# Patient Record
Sex: FEMALE | Race: WHITE | HISPANIC OR LATINO | Employment: STUDENT | URBAN - METROPOLITAN AREA
[De-identification: names, ages, dates, MRNs, and addresses within clinical notes are randomized per-mention and may not be internally consistent; named-entity substitution may affect disease eponyms.]

---
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
## 2024-01-04 NOTE — PROGRESS NOTES
1. Bilateral papilledema likely secondary to idiopathic intracranial hypertension but formal diagnosis not yet proven.    Patient with new onset chronic headaches and migraines 1/2023 with associated papilledema. Had MRI Brain/Orbit suggestive of high intracranial pressure. Patient has not had LP with CSF studies. Patient was tried on topamax but had to stop due to mental / mood disturbance side effects from medication. Baseline OCT today shows thickened RNFL each eye OD>left eye. She has edema grade 4/5 edema in the right eye and grade 2/5 in the left eye on fundus exam. Based upon history, presentation, and patient fits steotypical body habitus patient likely has IIH.  Central vision remains intact today however she has right eye more than left eye  visual field defects that are moderate right eye and more mild left eye corresponding to her papilledema grade.    Recommend completing work-up with MRV (to rule-out unlikely dural venous sinus thrombosis), LP and CSF studies. Will start patient on diamox while waiting for results and images. Discussed common side effects of diamox such as peripheral neuropathy and taste changes. Patient voiced understanding and agreed to starting medication.  Patient understands that close follow-up required initially as if she were to worsen in terms of visual fields or papilledema then we would need to consider urgent surgical intervention to minimize additional vision loss.    Plan   MRV Brain   LP and CSF studies   Start diamox 500mg twice a day for 1 week then increase 1000mg twice daily if tolerated  Discussed the pathophysiology of idiopathic intracranial hypertension and major risk factor of weight with benefit to modest weight loss- 10% of body weight from time of diagnosis (once her diagnosis is formally proven)  Return to clinic 2-3 weeks     Addendum: 1/17/24  MRV was negative for dural venous sinus thrombosis on 1/7/24  Lumbar puncture showed elevated opening pressure  at 36 cm H20 on Diamox.   Cerebrospinal fluid constituents normal including total protein (25.1) and cellcount (4 / 2 wbc / rbc). This proves a diagnosis of idiopathic intracranial hypertension. Follow-up with me scheduled for 1/25/24      Salina Ryan is a pleasant 18 year old female who presents to my neuro-ophthalmology clinic today having been referred by Dr. Gerber for papilledema.    HPI: 18-year-old female with no significant past medical history.  Saw her eye doctor Dr. Gerber on 12/18/2023 for left eye foreign body sensation that began that day.  She stated that she was also diagnosed with papilledema in August 2023 by a different provider.  She underwent MRI that was normal.  She followed up with a neurologist in October 2022 who diagnosed her with migraines and started her on topiramate.  Patient was inquiring about what else needs to be done, and patient was referred to neuro-ophthalmology.  At outside eye doctor's visit she was noted to have grade 3 papilledema in the right eye and grade 2 in the left.    Per patient started having severe headaches/migraines in February 2023 was seen by outside eye provider Dr. Weir in August 2023 who noted patient had optic nerve swelling. Patient was told to get an MRI which she got MRI Brain/Orbits that was read as normal. Patient was then referred to neurology Dr. Pelayo who she saw in 10/2023 who said patient had predominately migraines based upon MRI not showing flattening of the posterior globes. She was started on topamax 25mg BID which she took for about a week but stopped due to mental health reasons that were a side effect of the medication (depression and mood changes).  She also had prominent problems of mental clouding and difficulty with stuttering and responding quickly to questions.  Her neurologist was not aware that she stopped taking topiramate.  Headaches have improved she's only had 1 bad migraine since 10/2023 but continues to have  smaller headaches 1-2 weekly. She takes excedrine for the smaller headaches that help them resolve.     Today patient denies headaches, double vision, pulsatile tinnitus. She does endorse TVO's where her vision will black out OD>OS.  These episodes last about 15 seconds and have been present since around August.  She thinks these are improving recently.  Patient states she feels like she's gained weight but unsure of how much as she hasn't weighed herself. Denies prescription facial creams, vitamin A supplements, no liver intake, hormone contraception.     No abnormal blood clotting problems in the family or herself.    Independent historians:  Patient    Review of outside testing:  MRI Brain 8/17/23  Impression:   MRI Orbits:    1. No orbital abnormality identified.      MRI Head:    1. No acute intracranial abnormality.      My interpretation performed today of outside testing:  I have independently reviewed MRI Brain/orbit  performed 8/2023. There is globe flattening and dilated optic nerve sheath. There is questionable partially empty sella.       Review of outside clinical notes:    12/18/23 -- Visit with Dr. Gerber  Assessment/Plan:  1. Papilledema of both eyes  -Was diagnosed with papilledema in August with Dr. Weir. MRI normal, saw Neurologist in October but does not have any follow ups, diagnosed with migraines. Patient unsure what next steps are. Recommend consult with [facility]. Discussed importance of follow up, this is vision threatening.     2. Dry eye  3. Corneal erosion of left eye  -start systane complete 4x/day. Return to clinic if any pain/redness/light sensitivity/decreased vision. No past injury.     Follow up Neurop[facility]mology [facility].      10/17/23 - Visit with Neurology, Dr. Pelayo  Assessment and Plan:    #. Migraines  Patient's symptoms sound most consistent with a migraine given her photophobia, phonophobia, and improvement with sleeping/Excedrin. There is question of IIH  given the increased eye pressure found at an optometrist office. However, no worsening headaches with bending forward or laying down. Her MRI also did not show any evidence of disc flattening. Her discs were not clearly seen on exam today but eye report noted normal disc margins with only a slight increase in pressure in left eye to 24mmHg (normal limit 21). Weight loss of 10% of body weight would be helpful if IIH. Regardless, will start her on topiramate which would have benefit for both IIH and migraine headaches.  -Maintain headache diary  -Migraine abortive treatment (to stop headaches when they occur):  - Start sumatriptan 100mg PO PRN. Take this medication as close to the onset of symptoms as possible for maximum effect.  - If minimal or partial improvement, can increase dose to 100mg PO PRN  - If minimal to no improvement after an adequate trial of sumatriptan, other abortive medications that can be considered include: strong NSAIDs (naproxen, meloxicam,indomethacin, ketorolac), dopaminergic antiemetics (prochlorperazine, promethazine, metoclopramide), other triptans (eletriptan, rizatriptan), and steroids (prednisone, methylprednisolone, dexamethasone)  - Do not use opioids or barbiturates (including butalbital contained in Fioricet/Fiorinal) for chronic headaches, as these medications are associated with rebound headaches, dependence, tolerance, and withdrawa  - Migraine prophylactic treatment (to decrease headache frequency and severity):  - Start topiramate 25mg PO BID. Can increase dose to 50mg BID after 1 week at 25mg BID.  - If minimal improvement, other prophylactic medications that can be considered include: TCAs (nortriptyline), AEDs (valproic acid, gabapentin, pregabalin, lamotrigine, levetiracetam), beta-blockers (timolol, propranolol, nadolol, atenolol), verapamil, ARBs (candesartan, irbesartan, losartan), and supplements (riboflavin, magnesium, feverfew)  - Recommend daily physical activity for  migraine prophylaxis  - Recommend good sleep hygiene for improved sleep as well as headaches, including maintaining routine bedtime and wake up times, being physically active during the day, not lying in bed or using the bedroom for activities other than sleep, and minimizing screen time before bed    Anamika Pelayo MD      Past medical history:    [place] teeth extraction 10/2022  Mild scoliosis    Patient currently has no medications in their medication list..  Confirm patient takes no medications.    Family history / social history:  Patient's grandmother -HTN   Mother has grave's disease   Patient   None smoker, none alcohol intake     Exam:  VA 20/30 OD, 20/30 OS. Pupils no rAPD. Slit lamp exam unremarkable. Dilated fundus exam shows grade 4 papilledema right eye and 2 left eye.     Tests ordered and interpreted today:   OCT RNFL:    Severe diffuse retinal nerve fiber layer thickening right eye and moderate left eye compared to age-matched controls    Octopus automated 30 degree visual field-reliable in both eyes   Right eye: inferior arcuate scotoma and superior nasal step  Left eye: superior dense nasal step         Shorty Perera MD   Fellow, Neuro-Ophthalmology     Precharting:  Fabian Mohr MD, PGY3  Ophthalmology Resident  [facility]    65 minutes were spent on the date of the encounter by me doing chart review, history and exam, documentation, and further activities as noted above    Complete documentation of historical and exam elements from today's encounter can be found in the full encounter summary report (not reduplicated in this progress note).  I personally obtained the chief complaint(s) and history of present illness.  I confirmed and edited as necessary the review of systems, past medical/surgical history, family history, social history, and examination findings as documented by others; and I examined the patient myself.  I personally reviewed the relevant tests, images,  and reports as documented above.  I formulated and edited as necessary the assessment and plan and discussed the findings and management plan with the patient and family.  I personally reviewed the ophthalmic test(s) associated with this encounter, agree with the interpretation(s) as documented by the resident/fellow, and have edited the corresponding report(s) as necessary.     Jam Ospina MD

## 2024-01-05 ENCOUNTER — OFFICE VISIT (OUTPATIENT)
Dept: OPHTHALMOLOGY | Facility: CLINIC | Age: 19
End: 2024-01-05
Attending: OPHTHALMOLOGY
Payer: COMMERCIAL

## 2024-01-05 DIAGNOSIS — H47.11 PAPILLEDEMA ASSOCIATED WITH INCREASED INTRACRANIAL PRESSURE: Primary | ICD-10-CM

## 2024-01-05 DIAGNOSIS — H53.40 VISUAL FIELD DEFECT: ICD-10-CM

## 2024-01-05 DIAGNOSIS — H53.10 SUBJECTIVE VISUAL DISTURBANCE: ICD-10-CM

## 2024-01-05 PROBLEM — N94.6 SEVERE MENSTRUAL CRAMPS: Status: ACTIVE | Noted: 2018-11-06

## 2024-01-05 PROBLEM — H47.10 PAPILLEDEMA: Status: ACTIVE | Noted: 2023-08-01

## 2024-01-05 PROBLEM — M41.9 MILD SCOLIOSIS: Status: ACTIVE | Noted: 2018-05-08

## 2024-01-05 PROBLEM — K64.8 HEMORRHOIDS, INTERNAL: Status: ACTIVE | Noted: 2020-10-16

## 2024-01-05 PROBLEM — H52.223 REGULAR ASTIGMATISM, BILATERAL: Status: ACTIVE | Noted: 2023-08-01

## 2024-01-05 PROBLEM — H52.13 MYOPIA, BILATERAL: Status: ACTIVE | Noted: 2023-08-01

## 2024-01-05 PROBLEM — K59.09 CHRONIC CONSTIPATION: Status: ACTIVE | Noted: 2020-10-16

## 2024-01-05 PROCEDURE — 92083 EXTENDED VISUAL FIELD XM: CPT | Performed by: OPHTHALMOLOGY

## 2024-01-05 PROCEDURE — 99205 OFFICE O/P NEW HI 60 MIN: CPT | Mod: GC | Performed by: OPHTHALMOLOGY

## 2024-01-05 PROCEDURE — 99214 OFFICE O/P EST MOD 30 MIN: CPT | Performed by: OPHTHALMOLOGY

## 2024-01-05 PROCEDURE — 92133 CPTRZD OPH DX IMG PST SGM ON: CPT | Performed by: OPHTHALMOLOGY

## 2024-01-05 RX ORDER — ACETAZOLAMIDE 500 MG/1
1000 CAPSULE, EXTENDED RELEASE ORAL 2 TIMES DAILY
Qty: 120 CAPSULE | Refills: 3 | Status: SHIPPED | OUTPATIENT
Start: 2024-01-05 | End: 2024-02-01

## 2024-01-05 ASSESSMENT — CONF VISUAL FIELD
METHOD: COUNTING FINGERS
OS_INFERIOR_NASAL_RESTRICTION: 0
OS_NORMAL: 1
OS_SUPERIOR_NASAL_RESTRICTION: 0
OD_NORMAL: 1
OD_SUPERIOR_TEMPORAL_RESTRICTION: 0
OD_INFERIOR_NASAL_RESTRICTION: 0
OS_INFERIOR_TEMPORAL_RESTRICTION: 0
OD_INFERIOR_TEMPORAL_RESTRICTION: 0
OS_SUPERIOR_TEMPORAL_RESTRICTION: 0
OD_SUPERIOR_NASAL_RESTRICTION: 0

## 2024-01-05 ASSESSMENT — VISUAL ACUITY
OD_SC+: -1
OD_PH_SC+: +2
OS_SC: 20/30
OD_SC: 20/50
OD_PH_SC: 20/30
METHOD: SNELLEN - LINEAR

## 2024-01-05 ASSESSMENT — EXTERNAL EXAM - RIGHT EYE: OD_EXAM: NORMAL

## 2024-01-05 ASSESSMENT — SLIT LAMP EXAM - LIDS
COMMENTS: NORMAL
COMMENTS: NORMAL

## 2024-01-05 ASSESSMENT — EXTERNAL EXAM - LEFT EYE: OS_EXAM: NORMAL

## 2024-01-05 ASSESSMENT — TONOMETRY
OD_IOP_MMHG: 17
IOP_METHOD: ICARE
OS_IOP_MMHG: 14

## 2024-01-05 NOTE — LETTER
2024    RE: Salina Ryan  : 2005  MRN: [id number]    Dear Dr. Gerber    Thank you for referring your patient, Salina Ryan, to my neuro-ophthalmology clinic recently.  After a thorough neuro-ophthalmic history and examination, I came to the following conclusions:     1. Bilateral papilledema likely secondary to idiopathic intracranial hypertension but formal diagnosis not yet proven.    Patient with new onset chronic headaches and migraines 2023 with associated papilledema. Had MRI Brain/Orbit suggestive of high intracranial pressure. Patient has not had LP with CSF studies. Patient was tried on topamax but had to stop due to mental / mood disturbance side effects from medication. Baseline OCT today shows thickened RNFL each eye OD>left eye. She has edema grade 4/5 edema in the right eye and grade 2/5 in the left eye on fundus exam. Based upon history, presentation, and patient fits steotypical body habitus patient likely has IIH.  Central vision remains intact today however she has right eye more than left eye  visual field defects that are moderate right eye and more mild left eye corresponding to her papilledema grade.    Recommend completing work-up with MRV (to rule-out unlikely dural venous sinus thrombosis), LP and CSF studies. Will start patient on diamox while waiting for results and images. Discussed common side effects of diamox such as peripheral neuropathy and taste changes. Patient voiced understanding and agreed to starting medication.  Patient understands that close follow-up required initially as if she were to worsen in terms of visual fields or papilledema then we would need to consider urgent surgical intervention to minimize additional vision loss.    Plan   MRV Brain   LP and CSF studies   Start diamox 500mg twice a day for 1 week then increase 1000mg twice daily if tolerated  Discussed the pathophysiology of idiopathic intracranial hypertension and  major risk factor of weight with benefit to modest weight loss- 10% of body weight from time of diagnosis (once her diagnosis is formally proven)  Return to clinic 2-3 weeks     Addendum: 1/17/24  MRV was negative for dural venous sinus thrombosis on 1/7/24  Lumbar puncture showed elevated opening pressure at 36 cm H20 on Diamox.   Cerebrospinal fluid constituents normal including total protein (25.1) and cellcount (4 / 2 wbc / rbc). This proves a diagnosis of idiopathic intracranial hypertension. Follow-up with me scheduled for 1/25/24      Salina Ryan is a pleasant 18 year old female who presents to my neuro-ophthalmology clinic today having been referred by Dr. Gerber for papilledema.    HPI: 18-year-old female with no significant past medical history.  Saw her eye doctor Dr. Gerber on 12/18/2023 for left eye foreign body sensation that began that day.  She stated that she was also diagnosed with papilledema in August 2023 by a different provider.  She underwent MRI that was normal.  She followed up with a neurologist in October 2022 who diagnosed her with migraines and started her on topiramate.  Patient was inquiring about what else needs to be done, and patient was referred to neuro-ophthalmology.  At outside eye doctor's visit she was noted to have grade 3 papilledema in the right eye and grade 2 in the left.    Per patient started having severe headaches/migraines in February 2023 was seen by outside eye provider Dr. Weir in August 2023 who noted patient had optic nerve swelling. Patient was told to get an MRI which she got MRI Brain/Orbits that was read as normal. Patient was then referred to neurology Dr. Pelayo who she saw in 10/2023 who said patient had predominately migraines based upon MRI not showing flattening of the posterior globes. She was started on topamax 25mg BID which she took for about a week but stopped due to mental health reasons that were a side effect of the medication  (depression and mood changes).  She also had prominent problems of mental clouding and difficulty with stuttering and responding quickly to questions.  Her neurologist was not aware that she stopped taking topiramate.  Headaches have improved she's only had 1 bad migraine since 10/2023 but continues to have smaller headaches 1-2 weekly. She takes excedrine for the smaller headaches that help them resolve.     Today patient denies headaches, double vision, pulsatile tinnitus. She does endorse TVO's where her vision will black out OD>OS.  These episodes last about 15 seconds and have been present since around August.  She thinks these are improving recently.  Patient states she feels like she's gained weight but unsure of how much as she hasn't weighed herself. Denies prescription facial creams, vitamin A supplements, no liver intake, hormone contraception.     No abnormal blood clotting problems in the family or herself.    Independent historians:  Patient    Review of outside testing:  MRI Brain 8/17/23  Impression:   MRI Orbits:    1. No orbital abnormality identified.      MRI Head:    1. No acute intracranial abnormality.      My interpretation performed today of outside testing:  I have independently reviewed MRI Brain/orbit  performed 8/2023. There is globe flattening and dilated optic nerve sheath. There is questionable partially empty sella.       Review of outside clinical notes:    12/18/23 -- Visit with Dr. Gerber  Assessment/Plan:  1. Papilledema of both eyes  -Was diagnosed with papilledema in August with Dr. Weir. MRI normal, saw Neurologist in October but does not have any follow ups, diagnosed with migraines. Patient unsure what next steps are. Recommend consult with [facility]. Discussed importance of follow up, this is vision threatening.     2. Dry eye  3. Corneal erosion of left eye  -start systane complete 4x/day. Return to clinic if any pain/redness/light sensitivity/decreased  vision. No past injury.     Follow up Neurophthalmology [facility].      10/17/23 - Visit with Neurology, Dr. Pelayo  Assessment and Plan:    #. Migraines  Patient's symptoms sound most consistent with a migraine given her photophobia, phonophobia, and improvement with sleeping/Excedrin. There is question of IIH given the increased eye pressure found at an optometrist office. However, no worsening headaches with bending forward or laying down. Her MRI also did not show any evidence of disc flattening. Her discs were not clearly seen on exam today but eye report noted normal disc margins with only a slight increase in pressure in left eye to 24mmHg (normal limit 21). Weight loss of 10% of body weight would be helpful if IIH. Regardless, will start her on topiramate which would have benefit for both IIH and migraine headaches.  -Maintain headache diary  -Migraine abortive treatment (to stop headaches when they occur):  - Start sumatriptan 100mg PO PRN. Take this medication as close to the onset of symptoms as possible for maximum effect.  - If minimal or partial improvement, can increase dose to 100mg PO PRN  - If minimal to no improvement after an adequate trial of sumatriptan, other abortive medications that can be considered include: strong NSAIDs (naproxen, meloxicam,indomethacin, ketorolac), dopaminergic antiemetics (prochlorperazine, promethazine, metoclopramide), other triptans (eletriptan, rizatriptan), and steroids (prednisone, methylprednisolone, dexamethasone)  - Do not use opioids or barbiturates (including butalbital contained in Fioricet/Fiorinal) for chronic headaches, as these medications are associated with rebound headaches, dependence, tolerance, and withdrawa  - Migraine prophylactic treatment (to decrease headache frequency and severity):  - Start topiramate 25mg PO BID. Can increase dose to 50mg BID after 1 week at 25mg BID.  - If minimal improvement, other prophylactic medications that can be  considered include: TCAs (nortriptyline), AEDs (valproic acid, gabapentin, pregabalin, lamotrigine, levetiracetam), beta-blockers (timolol, propranolol, nadolol, atenolol), verapamil, ARBs (candesartan, irbesartan, losartan), and supplements (riboflavin, magnesium, feverfew)  - Recommend daily physical activity for migraine prophylaxis  - Recommend good sleep hygiene for improved sleep as well as headaches, including maintaining routine bedtime and wake up times, being physically active during the day, not lying in bed or using the bedroom for activities other than sleep, and minimizing screen time before bed    Anamika Pelayo MD      Past medical history:    [place] teeth extraction 10/2022  Mild scoliosis    Patient currently has no medications in their medication list..  Confirm patient takes no medications.    Family history / social history:  Patient's grandmother -HTN   Mother has grave's disease   Patient   None smoker, none alcohol intake     Exam:  VA 20/30 OD, 20/30 OS. Pupils no rAPD. Slit lamp exam unremarkable. Dilated fundus exam shows grade 4 papilledema right eye and 2 left eye.     Tests ordered and interpreted today:   OCT RNFL:    Severe diffuse retinal nerve fiber layer thickening right eye and moderate left eye compared to age-matched controls    Octopus automated 30 degree visual field-reliable in both eyes   Right eye: inferior arcuate scotoma and superior nasal step  Left eye: superior dense nasal step      Again, thank you for trusting me with the care of your patient.  For further exam details, please feel free to contact our office for additional records.  If you wish to contact me regarding this patient please email me at [email or web address] or give my clinic a call to arrange a phone conversation.    Sincerely,    Jam Ospina MD  , Neuro-Ophthalmology and Adult Strabismus Surgery  [facility] Chair in Neuro-Ophthalmology  Department of  Ophthalmology and Visual Neurosciences  [facility]    DX: papilledema, idiopathic intracranial hypertension

## 2024-01-05 NOTE — NURSING NOTE
Chief Complaint(s) and History of Present Illness(es)       Papilledema Evaluation    In both eyes.  Since onset it is stable.  Associated symptoms include dryness and headache.  Negative for double vision and eye pain.  Pain was noted as 0/10.             Comments    Salina Ryan is a 18 year old female sent for consultation by Dr. Gerber for papilledema.  Patient previously diagnosed with papilledema by Dr. Weir in 08/2023.  Was seen by neurology, Dr. Pelayo 10/2023 and instructed to start Topamax 25 mg BID x 1 week then 50 mg BID.  Salina says she only took the medication for a week.  She was supposed to return to clinic within a month but lost to follow up.  She reports she has glasses (astigmatism) but wears them mainly with driving.  No eye pain or discomfort.  She has migraines, has had one headache since December and felt like eyes were straining.  Very rare tinnitus in the right ear.  Right eye feels more dry than left eye, not currently on any eyedrops.   KEILY Campos 1/5/2024 10:36 AM

## 2024-01-07 ENCOUNTER — HOSPITAL ENCOUNTER (OUTPATIENT)
Dept: MRI IMAGING | Facility: HOSPITAL | Age: 19
Discharge: HOME | End: 2024-01-07
Attending: OPHTHALMOLOGY | Admitting: OPHTHALMOLOGY
Payer: COMMERCIAL

## 2024-01-07 DIAGNOSIS — H47.11 PAPILLEDEMA ASSOCIATED WITH INCREASED INTRACRANIAL PRESSURE: ICD-10-CM

## 2024-01-07 PROCEDURE — 255N000002 HC RX 255 OP 636: Performed by: OPHTHALMOLOGY

## 2024-01-07 PROCEDURE — A9585 GADOBUTROL INJECTION: HCPCS | Performed by: OPHTHALMOLOGY

## 2024-01-07 PROCEDURE — 70545 MR ANGIOGRAPHY HEAD W/DYE: CPT

## 2024-01-07 RX ORDER — GADOBUTROL 604.72 MG/ML
10 INJECTION INTRAVENOUS ONCE
Status: COMPLETED | OUTPATIENT
Start: 2024-01-07 | End: 2024-01-07

## 2024-01-07 RX ADMIN — GADOBUTROL 10 ML: 604.72 INJECTION INTRAVENOUS at 11:23

## 2024-01-11 DIAGNOSIS — H47.10 PAPILLEDEMA: Primary | ICD-10-CM

## 2024-01-12 ENCOUNTER — ANCILLARY PROCEDURE (OUTPATIENT)
Dept: INTERVENTIONAL RADIOLOGY/VASCULAR | Facility: CLINIC | Age: 19
End: 2024-01-12
Attending: OPHTHALMOLOGY
Payer: COMMERCIAL

## 2024-01-12 ENCOUNTER — LAB (OUTPATIENT)
Dept: LAB | Facility: CLINIC | Age: 19
End: 2024-01-12
Payer: COMMERCIAL

## 2024-01-12 VITALS
DIASTOLIC BLOOD PRESSURE: 71 MMHG | TEMPERATURE: 98.2 F | HEART RATE: 58 BPM | SYSTOLIC BLOOD PRESSURE: 133 MMHG | OXYGEN SATURATION: 99 %

## 2024-01-12 DIAGNOSIS — H47.10 PAPILLEDEMA: ICD-10-CM

## 2024-01-12 DIAGNOSIS — H47.11 PAPILLEDEMA ASSOCIATED WITH INCREASED INTRACRANIAL PRESSURE: ICD-10-CM

## 2024-01-12 LAB
APPEARANCE CSF: CLEAR
COLOR CSF: COLORLESS
HOLD SPECIMEN: NORMAL
HOLD SPECIMEN: NORMAL
INR PPP: 1.07 (ref 0.85–1.15)
PLATELET # BLD AUTO: 261 10E3/UL (ref 150–450)
RBC # CSF MANUAL: 2 /UL (ref 0–2)
TUBE # CSF: 3
WBC # CSF MANUAL: 4 /UL (ref 0–5)

## 2024-01-12 PROCEDURE — 84157 ASSAY OF PROTEIN OTHER: CPT | Performed by: OPHTHALMOLOGY

## 2024-01-12 PROCEDURE — 62328 DX LMBR SPI PNXR W/FLUOR/CT: CPT | Performed by: PHYSICIAN ASSISTANT

## 2024-01-12 PROCEDURE — 85610 PROTHROMBIN TIME: CPT | Performed by: PATHOLOGY

## 2024-01-12 PROCEDURE — 36415 COLL VENOUS BLD VENIPUNCTURE: CPT | Performed by: PATHOLOGY

## 2024-01-12 PROCEDURE — 85049 AUTOMATED PLATELET COUNT: CPT | Performed by: PATHOLOGY

## 2024-01-12 PROCEDURE — 82945 GLUCOSE OTHER FLUID: CPT | Performed by: OPHTHALMOLOGY

## 2024-01-12 PROCEDURE — 89050 BODY FLUID CELL COUNT: CPT | Performed by: OPHTHALMOLOGY

## 2024-01-12 PROCEDURE — 99000 SPECIMEN HANDLING OFFICE-LAB: CPT | Performed by: PATHOLOGY

## 2024-01-12 RX ORDER — LIDOCAINE HYDROCHLORIDE 10 MG/ML
5 INJECTION, SOLUTION EPIDURAL; INFILTRATION; INTRACAUDAL; PERINEURAL ONCE
Status: COMPLETED | OUTPATIENT
Start: 2024-01-12 | End: 2024-01-12

## 2024-01-12 RX ADMIN — LIDOCAINE HYDROCHLORIDE 5 ML: 10 INJECTION, SOLUTION EPIDURAL; INFILTRATION; INTRACAUDAL; PERINEURAL at 13:12

## 2024-01-12 NOTE — PROGRESS NOTES
Salina Ryan  [id number]    Completed lumbar puncture under fluoroscopic guidance.  A 22 gauge 3.5 inch spinal needle was advanced between L3-L4 with return of fluid.      An opening pressure of 36 cm of water was measured.      A total of 10 mL was collected in 4 tubes for requested labs. Needle removed after return of stylet.  No immediate complication.  Plan: lie flat for 1 hour.  Dx: Papilledema.  Naty.  <1

## 2024-01-12 NOTE — DISCHARGE INSTRUCTIONS
Discharge instructions for Lumbar Puncture           AFTER YOU GO HOME      Relax and take it easy for 24 hours  You may resume normal activity after the 24 hour period  You may develop a headache that will normally go away on its own in 1 to 2 days. Lying down should help relieve this pain.  If you develop a headache you can take over the counter medicines and lay down.  You can try drinking caffeine-coffee, soda.   Drink at least 4 glasses of extra fluid today if not on a fluid restriction  Continue to take your medications as ordered by your provider  You may remove the bandage and resume bathing the next day  DO NOT drive or operate machinery at home or at work for at least 24 hours               CALL YOU PRIMARY PHYSICIAN IF:    Severe head or neck pain that doesn't go away or that gets worse  You feel less alert or have trouble waking up  Repeated upset stomach (nausea) or vomiting  Swelling, pain, bruising, or redness at the puncture site  Fever of 100.4 F (38 C) or higher, or as advised by your provider  If you start to leak a large amount of fluid from the puncture site (also, lie down flat on your back)      Date: 11/12  Provider: NELSON Montenegro, Interventional Radiology, [facility]    [facility]  [address] [place], [place] [id number]

## 2024-01-13 LAB
GLUCOSE CSF-MCNC: 60 MG/DL (ref 40–70)
PROT CSF-MCNC: 25.1 MG/DL (ref 15–45)

## 2024-02-01 DIAGNOSIS — H47.11 PAPILLEDEMA ASSOCIATED WITH INCREASED INTRACRANIAL PRESSURE: ICD-10-CM

## 2024-02-01 RX ORDER — ACETAZOLAMIDE 500 MG/1
1000 CAPSULE, EXTENDED RELEASE ORAL 2 TIMES DAILY
Qty: 120 CAPSULE | Refills: 3 | Status: SHIPPED | OUTPATIENT
Start: 2024-02-01 | End: 2024-06-19

## 2024-02-22 ENCOUNTER — OFFICE VISIT (OUTPATIENT)
Dept: OPHTHALMOLOGY | Facility: CLINIC | Age: 19
End: 2024-02-22
Attending: OPHTHALMOLOGY
Payer: COMMERCIAL

## 2024-02-22 DIAGNOSIS — H53.10 SUBJECTIVE VISUAL DISTURBANCE: Primary | ICD-10-CM

## 2024-02-22 DIAGNOSIS — H47.10 PAPILLEDEMA: ICD-10-CM

## 2024-02-22 PROCEDURE — 92133 CPTRZD OPH DX IMG PST SGM ON: CPT | Mod: 26 | Performed by: OPHTHALMOLOGY

## 2024-02-22 PROCEDURE — 92014 COMPRE OPH EXAM EST PT 1/>: CPT | Performed by: OPHTHALMOLOGY

## 2024-02-22 PROCEDURE — 92083 EXTENDED VISUAL FIELD XM: CPT | Performed by: OPHTHALMOLOGY

## 2024-02-22 PROCEDURE — 92083 EXTENDED VISUAL FIELD XM: CPT | Mod: 26 | Performed by: OPHTHALMOLOGY

## 2024-02-22 PROCEDURE — 99214 OFFICE O/P EST MOD 30 MIN: CPT | Performed by: OPHTHALMOLOGY

## 2024-02-22 PROCEDURE — 92133 CPTRZD OPH DX IMG PST SGM ON: CPT | Performed by: OPHTHALMOLOGY

## 2024-02-22 ASSESSMENT — SLIT LAMP EXAM - LIDS
COMMENTS: NORMAL
COMMENTS: NORMAL

## 2024-02-22 ASSESSMENT — CONF VISUAL FIELD
OD_NORMAL: 1
OD_INFERIOR_TEMPORAL_RESTRICTION: 0
OS_SUPERIOR_TEMPORAL_RESTRICTION: 0
OS_NORMAL: 1
OD_SUPERIOR_TEMPORAL_RESTRICTION: 0
OS_INFERIOR_NASAL_RESTRICTION: 0
OS_INFERIOR_TEMPORAL_RESTRICTION: 0
OS_SUPERIOR_NASAL_RESTRICTION: 0
OD_INFERIOR_NASAL_RESTRICTION: 0
METHOD: COUNTING FINGERS
OD_SUPERIOR_NASAL_RESTRICTION: 0

## 2024-02-22 ASSESSMENT — REFRACTION_WEARINGRX
OS_SPHERE: -2.25
OD_CYLINDER: +1.00
SPECS_TYPE: SVL
OS_AXIS: 070
OD_AXIS: 097
OD_SPHERE: -2.00
OS_CYLINDER: +1.25

## 2024-02-22 ASSESSMENT — TONOMETRY
OD_IOP_MMHG: 19
OS_IOP_MMHG: 22
IOP_METHOD: ICARE

## 2024-02-22 ASSESSMENT — VISUAL ACUITY
OD_CC: 20/25
METHOD: SNELLEN - LINEAR
CORRECTION_TYPE: GLASSES
OD_CC+: +1
OS_CC: 20/20
OS_CC+: -2

## 2024-02-22 ASSESSMENT — EXTERNAL EXAM - RIGHT EYE: OD_EXAM: NORMAL

## 2024-02-22 ASSESSMENT — EXTERNAL EXAM - LEFT EYE: OS_EXAM: NORMAL

## 2024-02-22 NOTE — PROGRESS NOTES
1. Bilateral papilledema secondary to idiopathic intracranial hypertension     Patient with new onset chronic headaches and migraines 1/2023 with associated papilledema. Had MRI Brain/Orbit suggestive of high intracranial pressure.  Subsequent MRV was negative for dural venous sinus thrombosis on 1/7/24    Lumbar puncture showed elevated opening pressure at 36 cm H20 on Diamox. Cerebrospinal fluid constituents normal including total protein (25.1) and cellcount (4 / 2 wbc / rbc). This proves a diagnosis of idiopathic intracranial hypertension.     Today her retinal nerve fiber layer has come down significantly in both eyes since her initial visit in January 2023.  On fundus exam, she has improved to grade 2-3 edema right eye and tr edema left eye.  Her visual fields have both improved.    Counseled on 10% weight reduction for long-term management.    Plan   -Continue Diamox 1000 mg twice a day  -Follow-up in 2 months.    Salina Ryan is a pleasant 18 year old female who presents to my neuro-ophthalmology clinic today having been referred by Dr. Gerber for papilledema.     HPI: 18-year-old female with no significant past medical history.  Saw her eye doctor Dr. Gerber on 12/18/2023 for left eye foreign body sensation that began that day.  She stated that she was also diagnosed with papilledema in August 2023 by a different provider.  She underwent MRI that was normal.  She followed up with a neurologist in October 2022 who diagnosed her with migraines and started her on topiramate.  Patient was inquiring about what else needs to be done, and patient was referred to neuro-ophthalmology.  At outside eye doctor's visit she was noted to have grade 3 papilledema in the right eye and grade 2 in the left.     Per patient started having severe headaches/migraines in February 2023 was seen by outside eye provider Dr. Weir in August 2023 who noted patient had optic nerve swelling. Patient was told to get an  MRI which she got MRI Brain/Orbits that was read as normal. Patient was then referred to neurology Dr. Pelayo who she saw in 10/2023 who said patient had predominately migraines based upon MRI not showing flattening of the posterior globes. She was started on topamax 25mg BID which she took for about a week but stopped due to mental health reasons that were a side effect of the medication (depression and mood changes).  She also had prominent problems of mental clouding and difficulty with stuttering and responding quickly to questions.  Her neurologist was not aware that she stopped taking topiramate.  Headaches have improved she's only had 1 bad migraine since 10/2023 but continues to have smaller headaches 1-2 weekly. She takes excedrine for the smaller headaches that help them resolve.      Today patient denies headaches, double vision, pulsatile tinnitus. She does endorse TVO's where her vision will black out OD>OS.  These episodes last about 15 seconds and have been present since around August.  She thinks these are improving recently.  Patient states she feels like she's gained weight but unsure of how much as she hasn't weighed herself. Denies prescription facial creams, vitamin A supplements, no liver intake, hormone contraception.      No abnormal blood clotting problems in the family or herself.    Review of outside testing:  MRI Brain 8/17/23  Impression:   MRI Orbits:    1. No orbital abnormality identified.      MRI Head:    1. No acute intracranial abnormality.      My interpretation performed today of outside testing:  I have independently reviewed MRI Brain/orbit  performed 8/2023. There is globe flattening and dilated optic nerve sheath. There is questionable partially empty sella.      Review of outside clinical notes:     12/18/23 -- Visit with Dr. Gerber  Assessment/Plan:  1. Papilledema of both eyes  -Was diagnosed with papilledema in August with Dr. Weir. MRI normal, saw Neurologist in  October but does not have any follow ups, diagnosed with migraines. Patient unsure what next steps are. Recommend consult with [facility]. Discussed importance of follow up, this is vision threatening.     2. Dry eye  3. Corneal erosion of left eye  -start systane complete 4x/day. Return to clinic if any pain/redness/light sensitivity/decreased vision. No past injury.     Follow up [facility].       10/17/23 - Visit with Neurology, Dr. Pelayo  Assessment and Plan:    #. Migraines  Patient's symptoms sound most consistent with a migraine given her photophobia, phonophobia, and improvement with sleeping/Excedrin. There is question of IIH given the increased eye pressure found at an optometrist office. However, no worsening headaches with bending forward or laying down. Her MRI also did not show any evidence of disc flattening. Her discs were not clearly seen on exam today but eye report noted normal disc margins with only a slight increase in pressure in left eye to 24mmHg (normal limit 21). Weight loss of 10% of body weight would be helpful if IIH. Regardless, will start her on topiramate which would have benefit for both IIH and migraine headaches.  -Maintain headache diary  -Migraine abortive treatment (to stop headaches when they occur):  - Start sumatriptan 100mg PO PRN. Take this medication as close to the onset of symptoms as possible for maximum effect.  - If minimal or partial improvement, can increase dose to 100mg PO PRN  - If minimal to no improvement after an adequate trial of sumatriptan, other abortive medications that can be considered include: strong NSAIDs (naproxen, meloxicam,indomethacin, ketorolac), dopaminergic antiemetics (prochlorperazine, promethazine, metoclopramide), other triptans (eletriptan, rizatriptan), and steroids (prednisone, methylprednisolone, dexamethasone)  - Do not use opioids or barbiturates (including butalbital contained in Fioricet/Fiorinal) for  chronic headaches, as these medications are associated with rebound headaches, dependence, tolerance, and withdrawa  - Migraine prophylactic treatment (to decrease headache frequency and severity):  - Start topiramate 25mg PO BID. Can increase dose to 50mg BID after 1 week at 25mg BID.  - If minimal improvement, other prophylactic medications that can be considered include: TCAs (nortriptyline), AEDs (valproic acid, gabapentin, pregabalin, lamotrigine, levetiracetam), beta-blockers (timolol, propranolol, nadolol, atenolol), verapamil, ARBs (candesartan, irbesartan, losartan), and supplements (riboflavin, magnesium, feverfew)  - Recommend daily physical activity for migraine prophylaxis  - Recommend good sleep hygiene for improved sleep as well as headaches, including maintaining routine bedtime and wake up times, being physically active during the day, not lying in bed or using the bedroom for activities other than sleep, and minimizing screen time before bed    Anamika Pelayo MD    Past medical history:  [place] teeth extraction 10/2022  Mild scoliosis     Patient currently has no medications in their medication list..     Interval history since last visit with me on 1/5/24:  Salina is currently taking 1000 mg Diamox BID.  She is tolerating the medication well overall, paresthesias have subsided.  She often feels nauseous however.    Vision feels stable.  Her headache frequency has come down to once every 2 weeks (initially severe headaches every other day).  Denies double vision, TVOs, or pulsatile tinnitus.  She notes a high-pitched ringing in both ears.    Reports her weight is unchanged.    Exam:  VA 20/25 OD, 20/20 OS. Pupils no rAPD. Slit lamp exam unremarkable. Dilated fundus exam shows papilledema stage 2-3 right eye and trace left eye (both improved from last visit)     Tests ordered and interpreted today:  OCT RNFL:  Right eye: Average RNFL 150 microns, down from 308.  Left eye: Average RNFL 101  microns, down from 141.    GTop:  Right eye: Improved mean deviation with clearing inferior arcuate and superior nasal step  Left eye: Improved mean deviation with clearing superior nasal step      Complete documentation of historical and exam elements from today's encounter can be found in the full encounter summary report (not reduplicated in this progress note).  I personally obtained the chief complaint(s) and history of present illness.  I confirmed and edited as necessary the review of systems, past medical/surgical history, family history, social history, and examination findings as documented by others; and I examined the patient myself.  I personally reviewed the relevant tests, images, and reports as documented above.  I formulated and edited as necessary the assessment and plan and discussed the findings and management plan with the patient and family     MD Lisbeth Silva, OD

## 2024-02-22 NOTE — LETTER
2024    RE: Salina Ryan  : 2005  MRN: [id number]    Dear Providers,    I saw our mutual patient, Salina Ryan, in follow-up in my clinic recently.  After a thorough neuro-ophthalmic history and examination, I came to the following conclusions:     1. Bilateral papilledema secondary to idiopathic intracranial hypertension     Patient with new onset chronic headaches and migraines 2023 with associated papilledema. Had MRI Brain/Orbit suggestive of high intracranial pressure.  Subsequent MRV was negative for dural venous sinus thrombosis on 24    Lumbar puncture showed elevated opening pressure at 36 cm H20 on Diamox. Cerebrospinal fluid constituents normal including total protein (25.1) and cellcount (4 / 2 wbc / rbc). This proves a diagnosis of idiopathic intracranial hypertension.     Today her retinal nerve fiber layer has come down significantly in both eyes since her initial visit in 2023.  On fundus exam, she has improved to grade 2-3 edema right eye and tr edema left eye.  Her visual fields have both improved.    Counseled on 10% weight reduction for long-term management.    Plan   -Continue Diamox 1000 mg twice a day  -Follow-up in 2 months.    Salina Ryan is a pleasant 18 year old female who presents to my neuro-ophthalmology clinic today having been referred by Dr. Gerber for papilledema.     HPI: 18-year-old female with no significant past medical history.  Saw her eye doctor Dr. Gerber on 2023 for left eye foreign body sensation that began that day.  She stated that she was also diagnosed with papilledema in 2023 by a different provider.  She underwent MRI that was normal.  She followed up with a neurologist in 2022 who diagnosed her with migraines and started her on topiramate.  Patient was inquiring about what else needs to be done, and patient was referred to neuro-ophthalmology.  At outside eye doctor's visit she  was noted to have grade 3 papilledema in the right eye and grade 2 in the left.     Per patient started having severe headaches/migraines in February 2023 was seen by outside eye provider Dr. Weir in August 2023 who noted patient had optic nerve swelling. Patient was told to get an MRI which she got MRI Brain/Orbits that was read as normal. Patient was then referred to neurology Dr. Pelayo who she saw in 10/2023 who said patient had predominately migraines based upon MRI not showing flattening of the posterior globes. She was started on topamax 25mg BID which she took for about a week but stopped due to mental health reasons that were a side effect of the medication (depression and mood changes).  She also had prominent problems of mental clouding and difficulty with stuttering and responding quickly to questions.  Her neurologist was not aware that she stopped taking topiramate.  Headaches have improved she's only had 1 bad migraine since 10/2023 but continues to have smaller headaches 1-2 weekly. She takes excedrine for the smaller headaches that help them resolve.      Today patient denies headaches, double vision, pulsatile tinnitus. She does endorse TVO's where her vision will black out OD>OS.  These episodes last about 15 seconds and have been present since around August.  She thinks these are improving recently.  Patient states she feels like she's gained weight but unsure of how much as she hasn't weighed herself. Denies prescription facial creams, vitamin A supplements, no liver intake, hormone contraception.      No abnormal blood clotting problems in the family or herself.    Review of outside testing:  MRI Brain 8/17/23  Impression:   MRI Orbits:    1. No orbital abnormality identified.      MRI Head:    1. No acute intracranial abnormality.      My interpretation performed today of outside testing:  I have independently reviewed MRI Brain/orbit  performed 8/2023. There is globe flattening and dilated  optic nerve sheath. There is questionable partially empty sella.      Review of outside clinical notes:     12/18/23 -- Visit with Dr. Gerber  Assessment/Plan:  1. Papilledema of both eyes  -Was diagnosed with papilledema in August with Dr. Weir. MRI normal, saw Neurologist in October but does not have any follow ups, diagnosed with migraines. Patient unsure what next steps are. Recommend consult with [facility]. Discussed importance of follow up, this is vision threatening.     2. Dry eye  3. Corneal erosion of left eye  -start systane complete 4x/day. Return to clinic if any pain/redness/light sensitivity/decreased vision. No past injury.     Follow up [facility].       10/17/23 - Visit with Neurology, Dr. Pelayo  Assessment and Plan:    #. Migraines  Patient's symptoms sound most consistent with a migraine given her photophobia, phonophobia, and improvement with sleeping/Excedrin. There is question of IIH given the increased eye pressure found at an optometrist office. However, no worsening headaches with bending forward or laying down. Her MRI also did not show any evidence of disc flattening. Her discs were not clearly seen on exam today but eye report noted normal disc margins with only a slight increase in pressure in left eye to 24mmHg (normal limit 21). Weight loss of 10% of body weight would be helpful if IIH. Regardless, will start her on topiramate which would have benefit for both IIH and migraine headaches.  -Maintain headache diary  -Migraine abortive treatment (to stop headaches when they occur):  - Start sumatriptan 100mg PO PRN. Take this medication as close to the onset of symptoms as possible for maximum effect.  - If minimal or partial improvement, can increase dose to 100mg PO PRN  - If minimal to no improvement after an adequate trial of sumatriptan, other abortive medications that can be considered include: strong NSAIDs (naproxen, meloxicam,indomethacin,  ketorolac), dopaminergic antiemetics (prochlorperazine, promethazine, metoclopramide), other triptans (eletriptan, rizatriptan), and steroids (prednisone, methylprednisolone, dexamethasone)  - Do not use opioids or barbiturates (including butalbital contained in Fioricet/Fiorinal) for chronic headaches, as these medications are associated with rebound headaches, dependence, tolerance, and withdrawa  - Migraine prophylactic treatment (to decrease headache frequency and severity):  - Start topiramate 25mg PO BID. Can increase dose to 50mg BID after 1 week at 25mg BID.  - If minimal improvement, other prophylactic medications that can be considered include: TCAs (nortriptyline), AEDs (valproic acid, gabapentin, pregabalin, lamotrigine, levetiracetam), beta-blockers (timolol, propranolol, nadolol, atenolol), verapamil, ARBs (candesartan, irbesartan, losartan), and supplements (riboflavin, magnesium, feverfew)  - Recommend daily physical activity for migraine prophylaxis  - Recommend good sleep hygiene for improved sleep as well as headaches, including maintaining routine bedtime and wake up times, being physically active during the day, not lying in bed or using the bedroom for activities other than sleep, and minimizing screen time before bed    Anamika Pelayo MD    Past medical history:  [place] teeth extraction 10/2022  Mild scoliosis     Patient currently has no medications in their medication list..     Interval history since last visit with me on 1/5/24:  Salina is currently taking 1000 mg Diamox BID.  She is tolerating the medication well overall, paresthesias have subsided.  She often feels nauseous however.    Vision feels stable.  Her headache frequency has come down to once every 2 weeks (initially severe headaches every other day).  Denies double vision, TVOs, or pulsatile tinnitus.  She notes a high-pitched ringing in both ears.    Reports her weight is unchanged.    Exam:  VA 20/25 OD, 20/20 OS. Pupils  no rAPD. Slit lamp exam unremarkable. Dilated fundus exam shows papilledema stage 2-3 right eye and trace left eye (both improved from last visit)     Tests ordered and interpreted today:  OCT RNFL:  Right eye: Average RNFL 150 microns, down from 308.  Left eye: Average RNFL 101 microns, down from 141.    GTop:  Right eye: Improved mean deviation with clearing inferior arcuate and superior nasal step  Left eye: Improved mean deviation with clearing superior nasal step       For further exam details, please feel free to contact our office for additional records.  If you wish to contact me regarding this patient please email me at [email or web address] or give my clinic a call to arrange a phone conversation.    Sincerely,    Jam Ospina MD  , Neuro-Ophthalmology and Adult Strabismus Surgery  [facility]OME[facility] and Kelsie Reynaga [facility] in Neuro-Ophthalmology  Department of Ophthalmology and Visual Neurosciences  [facility]    DX:pseudotumor cerebri, papilledema

## 2024-04-24 ENCOUNTER — OFFICE VISIT (OUTPATIENT)
Dept: OPHTHALMOLOGY | Facility: CLINIC | Age: 19
End: 2024-04-24
Attending: OPHTHALMOLOGY
Payer: COMMERCIAL

## 2024-04-24 DIAGNOSIS — H53.40 VISUAL FIELD DEFECT: ICD-10-CM

## 2024-04-24 DIAGNOSIS — H47.10 PAPILLEDEMA: ICD-10-CM

## 2024-04-24 DIAGNOSIS — G93.2 IIH (IDIOPATHIC INTRACRANIAL HYPERTENSION): Primary | ICD-10-CM

## 2024-04-24 PROCEDURE — 92133 CPTRZD OPH DX IMG PST SGM ON: CPT | Performed by: OPHTHALMOLOGY

## 2024-04-24 PROCEDURE — 92014 COMPRE OPH EXAM EST PT 1/>: CPT | Mod: GC | Performed by: OPHTHALMOLOGY

## 2024-04-24 PROCEDURE — 92083 EXTENDED VISUAL FIELD XM: CPT | Performed by: OPHTHALMOLOGY

## 2024-04-24 PROCEDURE — 99213 OFFICE O/P EST LOW 20 MIN: CPT | Performed by: OPHTHALMOLOGY

## 2024-04-24 ASSESSMENT — EXTERNAL EXAM - RIGHT EYE: OD_EXAM: NORMAL

## 2024-04-24 ASSESSMENT — TONOMETRY
IOP_METHOD: ICARE SINGLE JC
OD_IOP_MMHG: 18
OS_IOP_MMHG: 20

## 2024-04-24 ASSESSMENT — VISUAL ACUITY
OS_CC+: -1
OD_CC+: -1
METHOD: SNELLEN - LINEAR
OS_CC: 20/20
OD_CC: 20/20

## 2024-04-24 ASSESSMENT — CONF VISUAL FIELD
METHOD: COUNTING FINGERS
OD_SUPERIOR_NASAL_RESTRICTION: 0
OS_NORMAL: 1
OS_SUPERIOR_TEMPORAL_RESTRICTION: 0
OD_NORMAL: 1
OD_INFERIOR_TEMPORAL_RESTRICTION: 0
OD_SUPERIOR_TEMPORAL_RESTRICTION: 0
OS_INFERIOR_NASAL_RESTRICTION: 0
OS_SUPERIOR_NASAL_RESTRICTION: 0
OD_INFERIOR_NASAL_RESTRICTION: 0
OS_INFERIOR_TEMPORAL_RESTRICTION: 0

## 2024-04-24 ASSESSMENT — REFRACTION_WEARINGRX
OD_AXIS: 097
OS_SPHERE: -2.25
OD_SPHERE: -2.00
SPECS_TYPE: SVL
OS_AXIS: 070
OD_CYLINDER: +1.00
OS_CYLINDER: +1.25

## 2024-04-24 ASSESSMENT — SLIT LAMP EXAM - LIDS
COMMENTS: NORMAL
COMMENTS: NORMAL

## 2024-04-24 ASSESSMENT — EXTERNAL EXAM - LEFT EYE: OS_EXAM: NORMAL

## 2024-04-24 NOTE — PROGRESS NOTES
1. Bilateral papilledema secondary to idiopathic intracranial hypertension     Patient with new onset chronic headaches and migraines around 2/2023 with associated papilledema noted in August 2023. Had MRI Brain/Orbit suggestive of high intracranial pressure without any other concerning structural causes of increased intracranial pressure.  Subsequent MRV was negative for dural venous sinus thrombosis on 1/7/24    Lumbar puncture showed elevated opening pressure at 36 cm H20 on Diamox. Cerebrospinal fluid constituents normal including total protein (25.1) and cellcount (4 / 2 wbc / rbc). This proves a diagnosis of idiopathic intracranial hypertension.     Today central vision remains normal in both eyes with improvement in RNFL thickness and visual field defects compared to last visit. On fundus exam she has had improvement in optic nerve edema each eye since last visit.    Counseled on importance of 10% weight reduction for long-term management.    Plan   -Decreased Diamox to 500 mg twice a day  -Follow-up in 3-4 months    Salina Ryan is a pleasant 18 year old female who presents to my neuro-ophthalmology clinic today having been referred by Dr. Gerber for papilledema.     HPI: 18-year-old female with no significant past medical history.  Saw her eye doctor Dr. Gerber on 12/18/2023 for left eye foreign body sensation that began that day.  She stated that she was also diagnosed with papilledema in August 2023 by a different provider.  She underwent MRI that was normal.  She followed up with a neurologist in October 2022 who diagnosed her with migraines and started her on topiramate.  Patient was inquiring about what else needs to be done, and patient was referred to neuro-ophthalmology.  At outside eye doctor's visit she was noted to have grade 3 papilledema in the right eye and grade 2 in the left.     Per patient started having severe headaches/migraines in February 2023 was seen by outside eye  provider Dr. Weir in August 2023 who noted patient had optic nerve swelling. Patient was told to get an MRI which she got MRI Brain/Orbits that was read as normal. Patient was then referred to neurology Dr. Pelayo who she saw in 10/2023 who said patient had predominately migraines based upon MRI not showing flattening of the posterior globes. She was started on topamax 25mg BID which she took for about a week but stopped due to mental health reasons that were a side effect of the medication (depression and mood changes).  She also had prominent problems of mental clouding and difficulty with stuttering and responding quickly to questions.  Her neurologist was not aware that she stopped taking topiramate.  Headaches have improved she's only had 1 bad migraine since 10/2023 but continues to have smaller headaches 1-2 weekly. She takes excedrine for the smaller headaches that help them resolve.      Today patient denies headaches, double vision, pulsatile tinnitus. She does endorse TVO's where her vision will black out OD>OS.  These episodes last about 15 seconds and have been present since around August.  She thinks these are improving recently.  Patient states she feels like she's gained weight but unsure of how much as she hasn't weighed herself. Denies prescription facial creams, vitamin A supplements, no liver intake, hormone contraception.      No abnormal blood clotting problems in the family or herself.    Review of outside testing:  MRI Brain 8/17/23  Impression:   MRI Orbits:    1. No orbital abnormality identified.      MRI Head:    1. No acute intracranial abnormality.      Review of outside clinical notes:     12/18/23 -- Visit with Dr. Gerber  Assessment/Plan:  1. Papilledema of both eyes  -Was diagnosed with papilledema in August with Dr. Weir. MRI normal, saw Neurologist in October but does not have any follow ups, diagnosed with migraines. Patient unsure what next steps are. Recommend consult  with [facility]. Discussed importance of follow up, this is vision threatening.     2. Dry eye  3. Corneal erosion of left eye  -start systane complete 4x/day. Return to clinic if any pain/redness/light sensitivity/decreased vision. No past injury.     Follow up [facility].       10/17/23 - Visit with Neurology, Dr. Pelayo  Assessment and Plan:    #. Migraines  Patient's symptoms sound most consistent with a migraine given her photophobia, phonophobia, and improvement with sleeping/Excedrin. There is question of IIH given the increased eye pressure found at an optometrist office. However, no worsening headaches with bending forward or laying down. Her MRI also did not show any evidence of disc flattening. Her discs were not clearly seen on exam today but eye report noted normal disc margins with only a slight increase in pressure in left eye to 24mmHg (normal limit 21). Weight loss of 10% of body weight would be helpful if IIH. Regardless, will start her on topiramate which would have benefit for both IIH and migraine headaches.  -Maintain headache diary  -Migraine abortive treatment (to stop headaches when they occur):  - Start sumatriptan 100mg PO PRN. Take this medication as close to the onset of symptoms as possible for maximum effect.  - If minimal or partial improvement, can increase dose to 100mg PO PRN  - If minimal to no improvement after an adequate trial of sumatriptan, other abortive medications that can be considered include: strong NSAIDs (naproxen, meloxicam,indomethacin, ketorolac), dopaminergic antiemetics (prochlorperazine, promethazine, metoclopramide), other triptans (eletriptan, rizatriptan), and steroids (prednisone, methylprednisolone, dexamethasone)  - Do not use opioids or barbiturates (including butalbital contained in Fioricet/Fiorinal) for chronic headaches, as these medications are associated with rebound headaches, dependence, tolerance, and withdrawa  -  Migraine prophylactic treatment (to decrease headache frequency and severity):  - Start topiramate 25mg PO BID. Can increase dose to 50mg BID after 1 week at 25mg BID.  - If minimal improvement, other prophylactic medications that can be considered include: TCAs (nortriptyline), AEDs (valproic acid, gabapentin, pregabalin, lamotrigine, levetiracetam), beta-blockers (timolol, propranolol, nadolol, atenolol), verapamil, ARBs (candesartan, irbesartan, losartan), and supplements (riboflavin, magnesium, feverfew)  - Recommend daily physical activity for migraine prophylaxis  - Recommend good sleep hygiene for improved sleep as well as headaches, including maintaining routine bedtime and wake up times, being physically active during the day, not lying in bed or using the bedroom for activities other than sleep, and minimizing screen time before bed    Anamika Pelayo MD    Past medical history:  [place] teeth extraction 10/2022  Mild scoliosis     Patient currently has no medications in their medication list..     Interval history since last visit with me on 2/22/24:  Salina is currently taking 1000 mg Diamox BID. She's still having some cognitive impairment and stuttering which she feels like the stuttering has gotten worse. She's had 1 headache since last visit and thinks this might have been due to the fact she was on her menstrual cycle at the time. She continues to hear a high pitched ringing in her ears.  Denies blurry vision, double vision, pulsatile tinnitus. No changes in weight.     MRV 1/7/24  IMPRESSION:  1.  No findings for dural venous sinus thrombosis.     2.  There is bilateral narrowing of the lateral aspect of the transverse sinuses. This finding in conjunction with mildly prominent partially empty sella, mildly prominent fluid along the optic nerves, posterior globe flattening and right intraocular   contusion of the optic nerve head seen on prior MRI suggest idiopathic intracranial  hypertension.    Opening pressure 36 cm H20 on lumbar puncture 1/12/24  Cerebrospinal fluid constituents normal   Cell count 4 / 2 wbc / rbc, total protein 25.1 (normal)    Exam:  VA 20/20 OD, 20/20 OS. Pupils no rAPD. Slit lamp exam unremarkable. Dilated fundus exam shows papilledema stage 1-2 right eye and trace edema versus chronic remodeling left eye (both improved from last visit)     Tests ordered and interpreted today:  OCT RNFL:  Improvement in RNFL thickening each eye compared to last visit      Octopus automated 30 degree visual field:  Bilateral temporal defects ( enlarged blind spots) improved compared to last visit           Shorty Perera MD   Fellow, Neuro-Ophthalmology     Complete documentation of historical and exam elements from today's encounter can be found in the full encounter summary report (not reduplicated in this progress note).  I personally obtained the chief complaint(s) and history of present illness.  I confirmed and edited as necessary the review of systems, past medical/surgical history, family history, social history, and examination findings as documented by others; and I examined the patient myself.  I personally reviewed the relevant tests, images, and reports as documented above.  I formulated and edited as necessary the assessment and plan and discussed the findings and management plan with the patient and family.  I personally reviewed the ophthalmic test(s) associated with this encounter, agree with the interpretation(s) as documented by the resident/fellow, and have edited the corresponding report(s) as necessary.     Jam Ospina MD

## 2024-04-24 NOTE — LETTER
2024    RE: Salina Ryan  : 2005  MRN: [id number]    Dear Providers,    I saw our mutual patient, Salina Ryan, in follow-up in my clinic recently.  After a thorough neuro-ophthalmic history and examination, I came to the following conclusions:     1. Bilateral papilledema secondary to idiopathic intracranial hypertension.     Patient with new onset chronic headaches and migraines around 2023 with associated papilledema noted in 2023. Had MRI Brain/Orbit suggestive of high intracranial pressure without any other concerning structural causes of increased intracranial pressure.  Subsequent MRV was negative for dural venous sinus thrombosis on 24.    Lumbar puncture showed elevated opening pressure at 36 cm H20 on Diamox. Cerebrospinal fluid constituents normal including total protein (25.1) and cellcount (4 / 2 wbc / rbc). This proves a diagnosis of idiopathic intracranial hypertension.     Today central vision remains normal in both eyes with improvement in RNFL thickness and visual field defects compared to last visit. On fundus exam she has had improvement in optic nerve edema each eye since last visit.    Counseled on importance of 10% weight reduction for long-term management.    Plan   -Decreased Diamox to 500 mg twice a day  -Follow-up in 3-4 months    Salina Ryan is a pleasant 18 year old female who presents to my neuro-ophthalmology clinic today having been referred by Dr. Gerber for papilledema.     HPI: 18-year-old female with no significant past medical history.  Saw her eye doctor Dr. Gerber on 2023 for left eye foreign body sensation that began that day.  She stated that she was also diagnosed with papilledema in 2023 by a different provider.  She underwent MRI that was normal.  She followed up with a neurologist in 2022 who diagnosed her with migraines and started her on topiramate.  Patient was inquiring about what  else needs to be done, and patient was referred to neuro-ophthalmology.  At outside eye doctor's visit she was noted to have grade 3 papilledema in the right eye and grade 2 in the left.  Per patient started having severe headaches/migraines in February 2023 was seen by outside eye provider Dr. Weir in August 2023 who noted patient had optic nerve swelling. Patient was told to get an MRI which she got MRI Brain/Orbits that was read as normal. Patient was then referred to neurology Dr. Pelayo who she saw in 10/2023 who said patient had predominately migraines based upon MRI not showing flattening of the posterior globes. She was started on topamax 25mg BID which she took for about a week but stopped due to mental health reasons that were a side effect of the medication (depression and mood changes).  She also had prominent problems of mental clouding and difficulty with stuttering and responding quickly to questions.  Her neurologist was not aware that she stopped taking topiramate.  Headaches have improved she's only had 1 bad migraine since 10/2023 but continues to have smaller headaches 1-2 weekly. She takes excedrine for the smaller headaches that help them resolve.      Today patient denies headaches, double vision, pulsatile tinnitus. She does endorse TVO's where her vision will black out OD>OS.  These episodes last about 15 seconds and have been present since around August.  She thinks these are improving recently.  Patient states she feels like she's gained weight but unsure of how much as she hasn't weighed herself. Denies prescription facial creams, vitamin A supplements, no liver intake, hormone contraception.      No abnormal blood clotting problems in the family or herself.    Review of outside testing:  MRI Brain 8/17/23  Impression:   MRI Orbits:    1. No orbital abnormality identified.      MRI Head:    1. No acute intracranial abnormality.      Review of outside clinical notes:     12/18/23 --  Visit with Dr. Gerber  Assessment/Plan:  1. Papilledema of both eyes  -Was diagnosed with papilledema in August with Dr. Weir. MRI normal, saw Neurologist in October but does not have any follow ups, diagnosed with migraines. Patient unsure what next steps are. Recommend consult with [facility]. Discussed importance of follow up, this is vision threatening.     2. Dry eye  3. Corneal erosion of left eye  -start systane complete 4x/day. Return to clinic if any pain/redness/light sensitivity/decreased vision. No past injury.     Follow up [facility].       10/17/23 - Visit with Neurology, Dr. Pelayo  Assessment and Plan:    #. Migraines  Patient's symptoms sound most consistent with a migraine given her photophobia, phonophobia, and improvement with sleeping/Excedrin. There is question of IIH given the increased eye pressure found at an optometrist office. However, no worsening headaches with bending forward or laying down. Her MRI also did not show any evidence of disc flattening. Her discs were not clearly seen on exam today but eye report noted normal disc margins with only a slight increase in pressure in left eye to 24mmHg (normal limit 21). Weight loss of 10% of body weight would be helpful if IIH. Regardless, will start her on topiramate which would have benefit for both IIH and migraine headaches.  -Maintain headache diary  -Migraine abortive treatment (to stop headaches when they occur):  - Start sumatriptan 100mg PO PRN. Take this medication as close to the onset of symptoms as possible for maximum effect.  - If minimal or partial improvement, can increase dose to 100mg PO PRN  - If minimal to no improvement after an adequate trial of sumatriptan, other abortive medications that can be considered include: strong NSAIDs (naproxen, meloxicam,indomethacin, ketorolac), dopaminergic antiemetics (prochlorperazine, promethazine, metoclopramide), other triptans (eletriptan,  rizatriptan), and steroids (prednisone, methylprednisolone, dexamethasone)  - Do not use opioids or barbiturates (including butalbital contained in Fioricet/Fiorinal) for chronic headaches, as these medications are associated with rebound headaches, dependence, tolerance, and withdrawa  - Migraine prophylactic treatment (to decrease headache frequency and severity):  - Start topiramate 25mg PO BID. Can increase dose to 50mg BID after 1 week at 25mg BID.  - If minimal improvement, other prophylactic medications that can be considered include: TCAs (nortriptyline), AEDs (valproic acid, gabapentin, pregabalin, lamotrigine, levetiracetam), beta-blockers (timolol, propranolol, nadolol, atenolol), verapamil, ARBs (candesartan, irbesartan, losartan), and supplements (riboflavin, magnesium, feverfew)  - Recommend daily physical activity for migraine prophylaxis  - Recommend good sleep hygiene for improved sleep as well as headaches, including maintaining routine bedtime and wake up times, being physically active during the day, not lying in bed or using the bedroom for activities other than sleep, and minimizing screen time before bed    Anamika Pelayo MD    Past medical history:  [place] teeth extraction 10/2022  Mild scoliosis     Patient currently has no medications in their medication list..     Interval history since last visit with me on 2/22/24:  Salina is currently taking 1000 mg Diamox BID. She's still having some cognitive impairment and stuttering which she feels like the stuttering has gotten worse. She's had 1 headache since last visit and thinks this might have been due to the fact she was on her menstrual cycle at the time. She continues to hear a high pitched ringing in her ears.  Denies blurry vision, double vision, pulsatile tinnitus. No changes in weight.     MRV 1/7/24  IMPRESSION:  1.  No findings for dural venous sinus thrombosis.     2.  There is bilateral narrowing of the lateral aspect of the  transverse sinuses. This finding in conjunction with mildly prominent partially empty sella, mildly prominent fluid along the optic nerves, posterior globe flattening and right intraocular   contusion of the optic nerve head seen on prior MRI suggest idiopathic intracranial hypertension.    Opening pressure 36 cm H20 on lumbar puncture 1/12/24  Cerebrospinal fluid constituents normal   Cell count 4 / 2 wbc / rbc, total protein 25.1 (normal)    Exam:  VA 20/20 OD, 20/20 OS. Pupils no rAPD. Slit lamp exam unremarkable. Dilated fundus exam shows papilledema stage 1-2 right eye and trace edema versus chronic remodeling left eye (both improved from last visit)     Tests ordered and interpreted today:  OCT RNFL:  Improvement in RNFL thickening each eye compared to last visit      Octopus automated 30 degree visual field:  Bilateral temporal defects ( enlarged blind spots) improved compared to last visit          For further exam details, please feel free to contact our office for additional records.  If you wish to contact me regarding this patient please email me at [facility] [email or web address] or give my clinic a call to arrange a phone conversation.    Sincerely,    Jam Ospina MD  , Neuro-Ophthalmology and Adult Strabismus Surgery  [facility] in Neuro-Ophthalmology  Department of Ophthalmology and Visual Neurosciences  [facility]

## 2024-05-19 ENCOUNTER — HEALTH MAINTENANCE LETTER (OUTPATIENT)
Age: 19
End: 2024-05-19

## 2024-06-19 DIAGNOSIS — H47.11 PAPILLEDEMA ASSOCIATED WITH INCREASED INTRACRANIAL PRESSURE: ICD-10-CM

## 2024-06-19 RX ORDER — ACETAZOLAMIDE 500 MG/1
500 CAPSULE, EXTENDED RELEASE ORAL 2 TIMES DAILY
Qty: 120 CAPSULE | Refills: 3 | Status: SHIPPED | OUTPATIENT
Start: 2024-06-19

## 2024-08-07 ENCOUNTER — OFFICE VISIT (OUTPATIENT)
Dept: OPHTHALMOLOGY | Facility: CLINIC | Age: 19
End: 2024-08-07
Attending: OPHTHALMOLOGY
Payer: COMMERCIAL

## 2024-08-07 DIAGNOSIS — H47.10 PAPILLEDEMA: Primary | ICD-10-CM

## 2024-08-07 PROCEDURE — 99214 OFFICE O/P EST MOD 30 MIN: CPT | Performed by: OPHTHALMOLOGY

## 2024-08-07 PROCEDURE — 92014 COMPRE OPH EXAM EST PT 1/>: CPT | Performed by: OPHTHALMOLOGY

## 2024-08-07 PROCEDURE — 92133 CPTRZD OPH DX IMG PST SGM ON: CPT | Performed by: OPHTHALMOLOGY

## 2024-08-07 RX ORDER — CYCLOBENZAPRINE HCL 10 MG
10 TABLET ORAL
COMMUNITY
Start: 2024-07-31

## 2024-08-07 ASSESSMENT — CONF VISUAL FIELD
OD_SUPERIOR_TEMPORAL_RESTRICTION: 0
OD_SUPERIOR_NASAL_RESTRICTION: 0
OS_SUPERIOR_TEMPORAL_RESTRICTION: 0
OD_INFERIOR_TEMPORAL_RESTRICTION: 0
OS_NORMAL: 1
OD_NORMAL: 1
METHOD: COUNTING FINGERS
OS_INFERIOR_NASAL_RESTRICTION: 0
OS_SUPERIOR_NASAL_RESTRICTION: 0
OD_INFERIOR_NASAL_RESTRICTION: 0
OS_INFERIOR_TEMPORAL_RESTRICTION: 0

## 2024-08-07 ASSESSMENT — EXTERNAL EXAM - LEFT EYE: OS_EXAM: NORMAL

## 2024-08-07 ASSESSMENT — REFRACTION_WEARINGRX
OD_AXIS: 097
OD_CYLINDER: +1.00
OS_CYLINDER: +1.25
OD_SPHERE: -2.00
OS_AXIS: 070
OS_SPHERE: -2.25
SPECS_TYPE: SVL

## 2024-08-07 ASSESSMENT — EXTERNAL EXAM - RIGHT EYE: OD_EXAM: NORMAL

## 2024-08-07 ASSESSMENT — VISUAL ACUITY
OD_CC: 20/25
OD_CC+: -2
METHOD: SNELLEN - LINEAR
CORRECTION_TYPE: GLASSES
OS_CC: 20/25
OS_CC+: -2

## 2024-08-07 ASSESSMENT — TONOMETRY
OD_IOP_MMHG: 14
IOP_METHOD: ICARE
OS_IOP_MMHG: 14

## 2024-08-07 ASSESSMENT — SLIT LAMP EXAM - LIDS
COMMENTS: NORMAL
COMMENTS: NORMAL

## 2024-08-07 NOTE — PROGRESS NOTES
1. Bilateral papilledema secondary to idiopathic intracranial hypertension     Patient with new onset chronic headaches and migraines around 2/2023 with associated papilledema noted in August 2023. Had MRI Brain/Orbit suggestive of high intracranial pressure without any other concerning structural causes of increased intracranial pressure.  Subsequent MRV was negative for dural venous sinus thrombosis on 1/7/24    Lumbar puncture showed elevated opening pressure at 36 cm H20 on Diamox. Cerebrospinal fluid constituents normal including total protein (25.1) and cellcount (4 / 2 wbc / rbc). This proved a diagnosis of idiopathic intracranial hypertension.     Today central vision remains normal in both eyes with improvement in RNFL thickness on right eye, stable thickness on the left eye, likely at baseline. On fundus exam she has a stable mild optic disc edema versus chronic remodeling of the right optic nerve head, with normal optic nerve on the left, about the same since her visit in April.     Counseled on importance of 10% weight reduction for long-term management. She is down 15 pounds since April, plans to lose a lot more in the coming months.     Plan   -Decreased Diamox to 500 mg once a day  -Follow-up in 3-4 months    Salina Ryan is a pleasant 18 year old female who presents to my neuro-ophthalmology clinic today having been referred by Dr. Gerber for papilledema.     HPI: 18-year-old female with no significant past medical history.  Saw her eye doctor Dr. Gerber on 12/18/2023 for left eye foreign body sensation that began that day.  She stated that she was also diagnosed with papilledema in August 2023 by a different provider.  She underwent MRI that was normal.  She followed up with a neurologist in October 2022 who diagnosed her with migraines and started her on topiramate.  Patient was inquiring about what else needs to be done, and patient was referred to neuro-ophthalmology.  At outside  eye doctor's visit she was noted to have grade 3 papilledema in the right eye and grade 2 in the left.     Per patient started having severe headaches/migraines in February 2023 was seen by outside eye provider Dr. Weir in August 2023 who noted patient had optic nerve swelling. Patient was told to get an MRI which she got MRI Brain/Orbits that was read as normal. Patient was then referred to neurology Dr. Pelayo who she saw in 10/2023 who said patient had predominately migraines based upon MRI not showing flattening of the posterior globes. She was started on topamax 25mg BID which she took for about a week but stopped due to mental health reasons that were a side effect of the medication (depression and mood changes).  She also had prominent problems of mental clouding and difficulty with stuttering and responding quickly to questions.  Her neurologist was not aware that she stopped taking topiramate.  Headaches have improved she's only had 1 bad migraine since 10/2023 but continues to have smaller headaches 1-2 weekly. She takes excedrine for the smaller headaches that help them resolve.      Today patient denies headaches, double vision, pulsatile tinnitus. She does endorse TVO's where her vision will black out OD>OS.  These episodes last about 15 seconds and have been present since around August.  She thinks these are improving recently.  Patient states she feels like she's gained weight but unsure of how much as she hasn't weighed herself. Denies prescription facial creams, vitamin A supplements, no liver intake, hormone contraception.      No abnormal blood clotting problems in the family or herself.    Review of outside testing:  MRI Brain 8/17/23  Impression:   MRI Orbits:    1. No orbital abnormality identified.      MRI Head:    1. No acute intracranial abnormality.      Review of outside clinical notes:     12/18/23 -- Visit with Dr. Gerber  Assessment/Plan:  1. Papilledema of both eyes  -Was  diagnosed with papilledema in August with Dr. Weir. MRI normal, saw Neurologist in October but does not have any follow ups, diagnosed with migraines. Patient unsure what next steps are. Recommend consult with [facility]. Discussed importance of follow up, this is vision threatening.     2. Dry eye  3. Corneal erosion of left eye  -start systane complete 4x/day. Return to clinic if any pain/redness/light sensitivity/decreased vision. No past injury.     Follow up [facility].       10/17/23 - Visit with Neurology, Dr. Pelayo  Assessment and Plan:    #. Migraines  Patient's symptoms sound most consistent with a migraine given her photophobia, phonophobia, and improvement with sleeping/Excedrin. There is question of IIH given the increased eye pressure found at an optometrist office. However, no worsening headaches with bending forward or laying down. Her MRI also did not show any evidence of disc flattening. Her discs were not clearly seen on exam today but eye report noted normal disc margins with only a slight increase in pressure in left eye to 24mmHg (normal limit 21). Weight loss of 10% of body weight would be helpful if IIH. Regardless, will start her on topiramate which would have benefit for both IIH and migraine headaches.  -Maintain headache diary  -Migraine abortive treatment (to stop headaches when they occur):  - Start sumatriptan 100mg PO PRN. Take this medication as close to the onset of symptoms as possible for maximum effect.  - If minimal or partial improvement, can increase dose to 100mg PO PRN  - If minimal to no improvement after an adequate trial of sumatriptan, other abortive medications that can be considered include: strong NSAIDs (naproxen, meloxicam,indomethacin, ketorolac), dopaminergic antiemetics (prochlorperazine, promethazine, metoclopramide), other triptans (eletriptan, rizatriptan), and steroids (prednisone, methylprednisolone, dexamethasone)  - Do not  use opioids or barbiturates (including butalbital contained in Fioricet/Fiorinal) for chronic headaches, as these medications are associated with rebound headaches, dependence, tolerance, and withdrawa  - Migraine prophylactic treatment (to decrease headache frequency and severity):  - Start topiramate 25mg PO BID. Can increase dose to 50mg BID after 1 week at 25mg BID.  - If minimal improvement, other prophylactic medications that can be considered include: TCAs (nortriptyline), AEDs (valproic acid, gabapentin, pregabalin, lamotrigine, levetiracetam), beta-blockers (timolol, propranolol, nadolol, atenolol), verapamil, ARBs (candesartan, irbesartan, losartan), and supplements (riboflavin, magnesium, feverfew)  - Recommend daily physical activity for migraine prophylaxis  - Recommend good sleep hygiene for improved sleep as well as headaches, including maintaining routine bedtime and wake up times, being physically active during the day, not lying in bed or using the bedroom for activities other than sleep, and minimizing screen time before bed    Anamika Pelayo MD    Past medical history:  [place] teeth extraction 10/2022  Mild scoliosis     Patient currently has no medications in their medication list..     MRV 1/7/24  IMPRESSION:  1.  No findings for dural venous sinus thrombosis.     2.  There is bilateral narrowing of the lateral aspect of the transverse sinuses. This finding in conjunction with mildly prominent partially empty sella, mildly prominent fluid along the optic nerves, posterior globe flattening and right intraocular   contusion of the optic nerve head seen on prior MRI suggest idiopathic intracranial hypertension.    Opening pressure 36 cm H20 on lumbar puncture 1/12/24  Cerebrospinal fluid constituents normal   Cell count 4 / 2 wbc / rbc, total protein 25.1 (normal)      Interval history since last visit with me on 4/24/24:  Misti is currently taking 500 mg BID Diamox. Since her last visit, she has  lost 15 pounds. The tingling side effects from Diamox has gone away completely. She has been getting more headaches, about once a week, which she attributes to possible stress from a new job. Taking Excedrin does help significantly and she is able to work through her headache. She endorses ringing in her ears that has gotten quieter in the last few months, she hears it more in the right ear than the left, but hears it all the time. Her nausea has gotten better as well. Denies pulsatile tinnitus, no changes in vision when she bends over.       Tests ordered and interpreted today:  OCT RNFL on   Right eye: Currently normal thickness, now in normal range. 104 today down from 111 on 4/24/24.  Left eye: Normal thickness, 98 today similar to 97 on 4/24/24.     Exam:  Visual acuity is 20/25 -2 and 20/25 -2, normal pupils, intraocular pressure 14 OU, and color vision 11/11 OU.    Please see epic chart for complete exam           Barney Ross, MS4, helped prepare this document and performed portions of the clinical exam.    Complete documentation of historical and exam elements from today's encounter can be found in the full encounter summary report (not reduplicated in this progress note).  I personally re-obtained the chief complaint(s) and history of present illness.  I confirmed and edited as necessary the review of systems, past medical/surgical history, family history, social history, and examination findings as documented by others; and I examined the patient myself.  I personally reviewed the relevant tests, images, and reports as documented above.  I formulated and edited as necessary the assessment and plan and discussed the findings and management plan with the patient and family     A medical student was involved in the care of the patient. I was present with the medical student who participated in the service and in the documentation of the note. I have  verified the history and personally performed the  physical exam and medical decision making. I extensively reviewed and edited when necessary the assessment and plan. I agree with the assessment and plan of care as documented in the note    Jam Ospina MD

## 2024-08-07 NOTE — NURSING NOTE
Chief Complaint(s) and History of Present Illness(es)       Papilledema Follow Up    In both eyes.  Since onset it is stable.  Associated symptoms include headache.  Negative for eye pain.  Pain was noted as 0/10. Additional comments: Here for papilledema follow-up.  Currently on Diamox 500 mg bid.  Still having headaches about 1-2 times per week.  No eye pain or discomfort.  She reports tinnitus in both ears, louder in right ear.  KEILY Campos 8/7/2024 2:13 PM

## 2024-12-06 ENCOUNTER — OFFICE VISIT (OUTPATIENT)
Dept: OPHTHALMOLOGY | Facility: CLINIC | Age: 19
End: 2024-12-06
Attending: OPHTHALMOLOGY
Payer: COMMERCIAL

## 2024-12-06 DIAGNOSIS — H47.10 PAPILLEDEMA: Primary | ICD-10-CM

## 2024-12-06 DIAGNOSIS — H47.11 PAPILLEDEMA ASSOCIATED WITH INCREASED INTRACRANIAL PRESSURE: ICD-10-CM

## 2024-12-06 PROCEDURE — 92133 CPTRZD OPH DX IMG PST SGM ON: CPT | Performed by: OPHTHALMOLOGY

## 2024-12-06 PROCEDURE — 92014 COMPRE OPH EXAM EST PT 1/>: CPT | Mod: GC | Performed by: OPHTHALMOLOGY

## 2024-12-06 PROCEDURE — 99214 OFFICE O/P EST MOD 30 MIN: CPT | Performed by: OPHTHALMOLOGY

## 2024-12-06 RX ORDER — ACETAZOLAMIDE 250 MG/1
250 TABLET ORAL DAILY
Qty: 90 TABLET | Refills: 4 | Status: SHIPPED | OUTPATIENT
Start: 2024-12-06

## 2024-12-06 ASSESSMENT — EXTERNAL EXAM - LEFT EYE: OS_EXAM: NORMAL

## 2024-12-06 ASSESSMENT — SLIT LAMP EXAM - LIDS
COMMENTS: NORMAL
COMMENTS: NORMAL

## 2024-12-06 ASSESSMENT — CONF VISUAL FIELD
OD_SUPERIOR_NASAL_RESTRICTION: 0
OD_SUPERIOR_TEMPORAL_RESTRICTION: 0
OS_SUPERIOR_NASAL_RESTRICTION: 0
OD_INFERIOR_NASAL_RESTRICTION: 0
OS_NORMAL: 1
OS_INFERIOR_NASAL_RESTRICTION: 0
OS_INFERIOR_TEMPORAL_RESTRICTION: 0
OD_NORMAL: 1
METHOD: COUNTING FINGERS
OD_INFERIOR_TEMPORAL_RESTRICTION: 0
OS_SUPERIOR_TEMPORAL_RESTRICTION: 0

## 2024-12-06 ASSESSMENT — EXTERNAL EXAM - RIGHT EYE: OD_EXAM: NORMAL

## 2024-12-06 ASSESSMENT — VISUAL ACUITY
OD_CC: 20/25
OS_CC: 20/25
METHOD: SNELLEN - LINEAR

## 2024-12-06 ASSESSMENT — REFRACTION_WEARINGRX
OS_SPHERE: -2.25
SPECS_TYPE: SVL
OD_AXIS: 097
OD_CYLINDER: +1.00
OS_CYLINDER: +1.25
OS_AXIS: 070
OD_SPHERE: -2.00

## 2024-12-06 ASSESSMENT — TONOMETRY
IOP_METHOD: ICARE
OD_IOP_MMHG: 18
OS_IOP_MMHG: 19

## 2024-12-06 NOTE — PROGRESS NOTES
1. Bilateral papilledema secondary to idiopathic intracranial hypertension     Patient with new onset chronic headaches and migraines around 2/2023 with associated papilledema noted in August 2023. Had MRI Brain/Orbit suggestive of high intracranial pressure without any other concerning structural causes of increased intracranial pressure.  Subsequent MRV was negative for dural venous sinus thrombosis on 1/7/24    Lumbar puncture showed elevated opening pressure at 36 cm H20 on Diamox. Cerebrospinal fluid constituents normal including total protein (25.1) and cellcount (4 / 2 wbc / rbc). This proved a diagnosis of idiopathic intracranial hypertension.     Today central vision remains normal in both eyes with stability in kaushal RNFL thickness in both eyes, likely at baseline. On fundus exam she has a stable mild optic disc edema versus chronic remodeling of the right optic nerve head, with normal optic nerve on the left, about the same since her visit.     Counseled on importance of 10% weight reduction for long-term management. She is down 15 pounds since April, plans to lose a lot more in the coming months.     Plan   -Decrease Diamox to 250 mg daily  -Follow-up in 3-4 months    Historical data including initial visit HPI:  HPI: 18-year-old female with no significant past medical history.  Saw her eye doctor Dr. Gerber on 12/18/2023 for left eye foreign body sensation that began that day.  She stated that she was also diagnosed with papilledema in August 2023 by a different provider.  She underwent MRI that was normal.  She followed up with a neurologist in October 2022 who diagnosed her with migraines and started her on topiramate.  Patient was inquiring about what else needs to be done, and patient was referred to neuro-ophthalmology.  At outside eye doctor's visit she was noted to have grade 3 papilledema in the right eye and grade 2 in the left.     Per patient started having severe headaches/migraines in  February 2023 was seen by outside eye provider Dr. Weir in August 2023 who noted patient had optic nerve swelling. Patient was told to get an MRI which she got MRI Brain/Orbits that was read as normal. Patient was then referred to neurology Dr. Pelayo who she saw in 10/2023 who said patient had predominately migraines based upon MRI not showing flattening of the posterior globes. She was started on topamax 25mg BID which she took for about a week but stopped due to mental health reasons that were a side effect of the medication (depression and mood changes).  She also had prominent problems of mental clouding and difficulty with stuttering and responding quickly to questions.  Her neurologist was not aware that she stopped taking topiramate.  Headaches have improved she's only had 1 bad migraine since 10/2023 but continues to have smaller headaches 1-2 weekly. She takes excedrine for the smaller headaches that help them resolve.      Today patient denies headaches, double vision, pulsatile tinnitus. She does endorse TVO's where her vision will black out OD>OS.  These episodes last about 15 seconds and have been present since around August.  She thinks these are improving recently.  Patient states she feels like she's gained weight but unsure of how much as she hasn't weighed herself. Denies prescription facial creams, vitamin A supplements, no liver intake, hormone contraception.      No abnormal blood clotting problems in the family or herself.    Review of outside testing:  MRI Brain 8/17/23  Impression:   MRI Orbits:    1. No orbital abnormality identified.      MRI Head:    1. No acute intracranial abnormality.      Review of outside clinical notes:     12/18/23 -- Visit with Dr. Gerber  Assessment/Plan:  1. Papilledema of both eyes  -Was diagnosed with papilledema in August with Dr. Weir. MRI normal, saw Neurologist in October but does not have any follow ups, diagnosed with migraines. Patient unsure  what next steps are. Recommend consult with [facility]. Discussed importance of follow up, this is vision threatening.     2. Dry eye  3. Corneal erosion of left eye  -start systane complete 4x/day. Return to clinic if any pain/redness/light sensitivity/decreased vision. No past injury.     Follow up [facility].       10/17/23 - Visit with Neurology, Dr. Pelayo  Assessment and Plan:    #. Migraines  Patient's symptoms sound most consistent with a migraine given her photophobia, phonophobia, and improvement with sleeping/Excedrin. There is question of IIH given the increased eye pressure found at an optometrist office. However, no worsening headaches with bending forward or laying down. Her MRI also did not show any evidence of disc flattening. Her discs were not clearly seen on exam today but eye report noted normal disc margins with only a slight increase in pressure in left eye to 24mmHg (normal limit 21). Weight loss of 10% of body weight would be helpful if IIH. Regardless, will start her on topiramate which would have benefit for both IIH and migraine headaches.  -Maintain headache diary  -Migraine abortive treatment (to stop headaches when they occur):  - Start sumatriptan 100mg PO PRN. Take this medication as close to the onset of symptoms as possible for maximum effect.  - If minimal or partial improvement, can increase dose to 100mg PO PRN  - If minimal to no improvement after an adequate trial of sumatriptan, other abortive medications that can be considered include: strong NSAIDs (naproxen, meloxicam,indomethacin, ketorolac), dopaminergic antiemetics (prochlorperazine, promethazine, metoclopramide), other triptans (eletriptan, rizatriptan), and steroids (prednisone, methylprednisolone, dexamethasone)  - Do not use opioids or barbiturates (including butalbital contained in Fioricet/Fiorinal) for chronic headaches, as these medications are associated with rebound headaches,  dependence, tolerance, and withdrawa  - Migraine prophylactic treatment (to decrease headache frequency and severity):  - Start topiramate 25mg PO BID. Can increase dose to 50mg BID after 1 week at 25mg BID.  - If minimal improvement, other prophylactic medications that can be considered include: TCAs (nortriptyline), AEDs (valproic acid, gabapentin, pregabalin, lamotrigine, levetiracetam), beta-blockers (timolol, propranolol, nadolol, atenolol), verapamil, ARBs (candesartan, irbesartan, losartan), and supplements (riboflavin, magnesium, feverfew)  - Recommend daily physical activity for migraine prophylaxis  - Recommend good sleep hygiene for improved sleep as well as headaches, including maintaining routine bedtime and wake up times, being physically active during the day, not lying in bed or using the bedroom for activities other than sleep, and minimizing screen time before bed    Anamika Pelayo MD    Past medical history:  [place] teeth extraction 10/2022  Mild scoliosis    MRV 1/7/24  IMPRESSION:  1.  No findings for dural venous sinus thrombosis.     2.  There is bilateral narrowing of the lateral aspect of the transverse sinuses. This finding in conjunction with mildly prominent partially empty sella, mildly prominent fluid along the optic nerves, posterior globe flattening and right intraocular   contusion of the optic nerve head seen on prior MRI suggest idiopathic intracranial hypertension.    Opening pressure 36 cm H20 on lumbar puncture 1/12/24  Cerebrospinal fluid constituents normal   Cell count 4 / 2 wbc / rbc, total protein 25.1 (normal)    Interval history since last visit with me on 8/7/24:  Patient's diamox was decreased to 500 mg daily since last visit.   She reports doing very well. She notes having headaches about once very two weeks, but thinks more related to stress/tension as opposed to IIH headaches. Reports tinnitus but not pulsatile. Denies pain behind the eyes or TVOs, which she had  prior to treatment. No concerns with side effects of diamox.     Exam:  Stable optic nerve appearance with mild gliotic edema versus chronic remodeling of the right optic nerve head and no edema of the left optic nerve head.  Visual acuity and color vision remain essentially normal in both eyes.    OCT of the optic nerve head revealed   In the right eye-stable retinal nerve fiber layer thickness compared to last visit and at kaushal values  In the left eye -  stable retinal nerve fiber layer thickness compared to last visit and at kaushal values       Adalberto Lee MD  Resident Physician, PGY-3  Department of Ophthalmology       Complete documentation of historical and exam elements from today's encounter can be found in the full encounter summary report (not reduplicated in this progress note).  I personally obtained the chief complaint(s) and history of present illness.  I confirmed and edited as necessary the review of systems, past medical/surgical history, family history, social history, and examination findings as documented by others; and I examined the patient myself.  I personally reviewed the relevant tests, images, and reports as documented above.  I formulated and edited as necessary the assessment and plan and discussed the findings and management plan with the patient and family.  I personally reviewed the ophthalmic test(s) associated with this encounter, agree with the interpretation(s) as documented by the resident/fellow, and have edited the corresponding report(s) as necessary.     Jam Ospina MD

## 2024-12-06 NOTE — NURSING NOTE
Chief Complaint(s) and History of Present Illness(es)       Papilledema Follow Up    In both eyes.  Since onset it is stable.  Associated symptoms include eye pain and headache.  Pain was noted as 0/10. Additional comments: 4 month follow-up for papilledema.  Patient is currently on Diamox 500 mg once daily.  Mild eye pain in the left eye. She will get a headache once every two weeks.  She gets ringing in both ears.   KEILY Campos 12/6/2024 7:23 AM

## 2025-06-08 ENCOUNTER — HEALTH MAINTENANCE LETTER (OUTPATIENT)
Age: 20
End: 2025-06-08

## (undated) RX ORDER — LIDOCAINE HYDROCHLORIDE 10 MG/ML
INJECTION, SOLUTION EPIDURAL; INFILTRATION; INTRACAUDAL; PERINEURAL
Status: DISPENSED
Start: 2024-01-12